# Patient Record
Sex: MALE | Race: WHITE
[De-identification: names, ages, dates, MRNs, and addresses within clinical notes are randomized per-mention and may not be internally consistent; named-entity substitution may affect disease eponyms.]

---

## 2017-10-19 ENCOUNTER — HOSPITAL ENCOUNTER (INPATIENT)
Dept: HOSPITAL 24 - ER | Age: 72
LOS: 2 days | Discharge: HOME | DRG: 379 | End: 2017-10-21
Attending: INTERNAL MEDICINE | Admitting: INTERNAL MEDICINE
Payer: COMMERCIAL

## 2017-10-19 VITALS — BODY MASS INDEX: 22 KG/M2

## 2017-10-19 DIAGNOSIS — Z87.19: ICD-10-CM

## 2017-10-19 DIAGNOSIS — R94.31: ICD-10-CM

## 2017-10-19 DIAGNOSIS — I10: ICD-10-CM

## 2017-10-19 DIAGNOSIS — R10.84: ICD-10-CM

## 2017-10-19 DIAGNOSIS — M13.89: ICD-10-CM

## 2017-10-19 DIAGNOSIS — K92.2: Primary | ICD-10-CM

## 2017-10-19 DIAGNOSIS — R53.1: ICD-10-CM

## 2017-10-19 LAB
ALBUMIN SERPL BCP-MCNC: 2.9 G/DL (ref 3.4–5)
ALP SERPL-CCNC: 68 UNITS/L (ref 46–116)
ALT SERPL W P-5'-P-CCNC: 11 UNITS/L (ref 12–78)
AMYLASE SERPL-CCNC: 43 UNITS/L (ref 25–115)
AST SERPL W P-5'-P-CCNC: 13 UNITS/L (ref 15–37)
BACTERIA URNS QL MICRO: NEGATIVE /HPF
BASOPHILS # BLD AUTO: 0 X10^3/UL (ref 0–0.1)
BASOPHILS NFR BLD AUTO: 0.7 % (ref 0.2–1)
BILIRUB UR QL STRIP.AUTO: NEGATIVE
BUN SERPL-MCNC: 29 MG/DL (ref 7–18)
CALCIUM ALBUM COR SERPL-SCNC: (no result) MMOL/L (ref 136–145)
CALCIUM ALBUM COR SERPL-SCNC: 9.4 MG/DL (ref 8.5–10.1)
CALCIUM SERPL-MCNC: 8.5 MG/DL (ref 8.5–10.1)
CHLORIDE SERPL-SCNC: 105 MMOL/L (ref 98–107)
CK MB SERPL-MCNC: < 1 NG/ML (ref 0–4)
CK SERPL-CCNC: 33 UNITS/L (ref 39–308)
CKMB %: 3 % (ref ?–4)
CLARITY UR: (no result)
CO2 SERPL-SCNC: 27.8 MMOL/L (ref 21–32)
COLOR UR AUTO: YELLOW
CREAT SERPL-MCNC: 1.61 MG/DL (ref 0.7–1.3)
EGFR  BLACK RACES: 55 (ref 60–?)
EOSINOPHIL # BLD AUTO: 0.2 X10^3/UL (ref 0–0.2)
EOSINOPHIL NFR BLD AUTO: 2.4 % (ref 0.9–2.9)
ERYTHROCYTE [DISTWIDTH] IN BLOOD BY AUTOMATED COUNT: 15.2 % (ref 11.6–16.5)
GLUCOSE UR QL STRIP.AUTO: NEGATIVE
HCT VFR BLD AUTO: 31.4 % (ref 42–54)
HCT VFR BLD AUTO: 34.6 % (ref 42–54)
HCT VFR BLD AUTO: 37.8 % (ref 42–54)
HGB BLD-MCNC: 10.6 G/DL (ref 13.5–18)
HGB BLD-MCNC: 11.6 G/DL (ref 13.5–18)
HGB BLD-MCNC: 12.6 G/DL (ref 13.5–18)
KETONES UR QL STRIP.AUTO: (no result)
LEUKOCYTE ESTERASE UR QL STRIP.AUTO: NEGATIVE
LIPASE SERPL-CCNC: 118 UNITS/L (ref 73–393)
LYMPHOCYTES # BLD AUTO: 1.3 X10^3/UL (ref 1.3–2.9)
LYMPHOCYTES NFR BLD AUTO: 19.7 % (ref 21–51)
MCH RBC QN AUTO: 32 PG (ref 27–34)
MCHC RBC AUTO-ENTMCNC: 33.2 G/DL (ref 33–35)
MCV RBC AUTO: 96.5 FL (ref 80–100)
MONOCYTES # BLD AUTO: 0.2 X10^3/UL (ref 0.3–0.8)
MONOCYTES NFR BLD AUTO: 2.7 % (ref 0–13)
NEUTROPHILS # BLD AUTO: 4.7 X10^3/UL (ref 2.2–4.8)
NEUTROPHILS NFR BLD AUTO: 74.5 % (ref 42–75)
NITRITE UR QL STRIP.AUTO: NEGATIVE
PH UR STRIP.AUTO: 5 [PH] (ref 5–8)
PLATELET # BLD: 188 X10^3/UL (ref 150–450)
PMV BLD AUTO: 8.3 FL (ref 7.4–11)
PROT SERPL-MCNC: 6.6 G/DL (ref 6.4–8.2)
PROT UR QL STRIP.AUTO: (no result)
RBC # BLD AUTO: 3.92 X10^6/UL (ref 4.7–6)
RBC # UR STRIP.AUTO: NEGATIVE /UL
RBC #/AREA URNS HPF: (no result) /HPF
SODIUM SERPL-SCNC: 139 MMOL/L (ref 136–145)
SP GR UR STRIP.AUTO: 1 (ref 1–1.03)
SQUAMOUS URNS QL MICRO: (no result) /HPF
TROPONIN I SERPL-MCNC: < 0.02 NG/ML (ref 0–1.5)
UROBILINOGEN UR QL STRIP.AUTO: NORMAL
WBC NRBC COR # BLD AUTO: 6.4 X10^3/UL (ref 3.6–10)

## 2017-10-19 PROCEDURE — 85018 HEMOGLOBIN: CPT

## 2017-10-19 PROCEDURE — 80053 COMPREHEN METABOLIC PANEL: CPT

## 2017-10-19 PROCEDURE — 82550 ASSAY OF CK (CPK): CPT

## 2017-10-19 PROCEDURE — 85025 COMPLETE CBC W/AUTO DIFF WBC: CPT

## 2017-10-19 PROCEDURE — 36415 COLL VENOUS BLD VENIPUNCTURE: CPT

## 2017-10-19 PROCEDURE — 82270 OCCULT BLOOD FECES: CPT

## 2017-10-19 PROCEDURE — 85610 PROTHROMBIN TIME: CPT

## 2017-10-19 PROCEDURE — 99284 EMERGENCY DEPT VISIT MOD MDM: CPT

## 2017-10-19 PROCEDURE — 93005 ELECTROCARDIOGRAM TRACING: CPT

## 2017-10-19 PROCEDURE — C9113 INJ PANTOPRAZOLE SODIUM, VIA: HCPCS

## 2017-10-19 PROCEDURE — 82553 CREATINE MB FRACTION: CPT

## 2017-10-19 PROCEDURE — 82150 ASSAY OF AMYLASE: CPT

## 2017-10-19 PROCEDURE — 74177 CT ABD & PELVIS W/CONTRAST: CPT

## 2017-10-19 PROCEDURE — 83690 ASSAY OF LIPASE: CPT

## 2017-10-19 PROCEDURE — 85014 HEMATOCRIT: CPT

## 2017-10-19 PROCEDURE — 94760 N-INVAS EAR/PLS OXIMETRY 1: CPT

## 2017-10-19 PROCEDURE — S0030 INJECTION, METRONIDAZOLE: HCPCS

## 2017-10-19 PROCEDURE — 84484 ASSAY OF TROPONIN QUANT: CPT

## 2017-10-19 PROCEDURE — 96374 THER/PROPH/DIAG INJ IV PUSH: CPT

## 2017-10-19 PROCEDURE — 96367 TX/PROPH/DG ADDL SEQ IV INF: CPT

## 2017-10-19 PROCEDURE — 96375 TX/PRO/DX INJ NEW DRUG ADDON: CPT

## 2017-10-19 PROCEDURE — 96365 THER/PROPH/DIAG IV INF INIT: CPT

## 2017-10-19 PROCEDURE — 81001 URINALYSIS AUTO W/SCOPE: CPT

## 2017-10-19 PROCEDURE — S0028 INJECTION, FAMOTIDINE, 20 MG: HCPCS

## 2017-10-19 RX ADMIN — CLONAZEPAM PRN MG: 1 TABLET ORAL at 23:54

## 2017-10-19 RX ADMIN — PANTOPRAZOLE SODIUM SCH MLS/HR: 40 INJECTION, POWDER, FOR SOLUTION INTRAVENOUS at 22:54

## 2017-10-19 RX ADMIN — MEPERIDINE HYDROCHLORIDE PRN MG: 25 INJECTION INTRAMUSCULAR; INTRAVENOUS; SUBCUTANEOUS at 22:58

## 2017-10-19 RX ADMIN — METRONIDAZOLE SCH MLS/HR: 5 INJECTION, SOLUTION INTRAVENOUS at 21:23

## 2017-10-19 RX ADMIN — MEPERIDINE HYDROCHLORIDE PRN MG: 25 INJECTION INTRAMUSCULAR; INTRAVENOUS; SUBCUTANEOUS at 16:58

## 2017-10-19 RX ADMIN — PANTOPRAZOLE SODIUM SCH MLS/HR: 40 INJECTION, POWDER, FOR SOLUTION INTRAVENOUS at 13:21

## 2017-10-19 RX ADMIN — SODIUM CHLORIDE SCH MLS/HR: 9 INJECTION, SOLUTION INTRAVENOUS at 13:21

## 2017-10-19 NOTE — DR.GENAD
HPI





- PCP


Primary Care Physician: S





- HPI Comment


HPI Comment: PROGRESSIVE OVER FEW MONTHS. WORSE TODAY. NO FEVER. DYSPNEA ON 

EXERSION.





- Complaint/Symptoms


Chief Complaint Doctors Comments: GENERALIZE WEAKNESS, ABDOMINAL PAIN AND GI 

BLEEDING.


Chief Complaint:: PT. STATES HE THINKS HIS ULCERATIVE COLITIS IS FLARING UP. 

PT. STATES HE HAS HAD BLOOD IN HIS STOOL FOR ABOUT 1-2 MONTHS. LAST BOWEL 

MOVEMENT THIS MORNING. PT. ALSO C/O WEAKNESS AND DIZZINESS UPON STANDING.





- Nurses notes reviewed


Nurses Notes Review: Yes





- Source


History Provided: Patient, Family Member





- Mode of Arrival


Mode of Arrival: Wheelchair





- Timing


Onset of Chief Complaint: 08/20/17


Came on: Gradually





- Duration


Duration: Constant


Duration: Days





- Severity


Severity: Moderate





PMH





- PMH


Past Medical History: Yes


Past Medical History: Arthritis, Hypertension, Kidney Stones


Past Medical History Comment: ULCERATIVE COLITIS, BIPOLAR


Past Surgical History: Yes


Surgical History: Abdominal Surgery, Ortho Surgery, Other





- Family History


History of Family Medical Conditions: Yes


Family Medical History: MI, Coronary Artery Disease, Hypertension





- Social History


Does patient currently use any type of tobacco product: No


Have you used tobacco products in the last 12 months: No


Type of Tobacco Use: None


Does any household member use tobacco: No


Alcohol Use: None


Do you use any recreational Drugs:: No


Lives With: Spouse


Lives Where: Home





- infectious screening


In the last 2 months have you had wt loss of >10#?: NO


Have you had fever, night sweats or hemotysis?: No


Have you traveled outside the country in the last 6 months?: No


Isolation: Standard





ROS





- Review of Systems


Constitutional: Weakness


Eyes: Tearing


ENTM: negative: Ear Pain, Nose Discharge, Nose Congestion, Throat Pain


Respiratoy: Non-Productive Cough, Short of Breath.  negative: Productive Cough, 

Wheezing, Hemoptysis


Cardiovascular: negative: Syncope (NEAR SYNCOPAL FEELING.)


Gastrointestinal/Abdominal: Abdominal Pain


Genitourinary: negative: Dysuria, Hematuria


Neurological: Weakness


Musculoskeletal: Joint Swelling, Muscle Pain


Integumentary: No Symptoms Reported


Endocrine: No Symptoms Reported


All Other Systems: Reviewed and Negative





PE





- Vital Signs


Vitals: 


 





Temperature                      97.4 F


Pulse Rate                       78


Respiratory Rate                 17


Blood Pressure [Left Arm]        97/58


Blood Pressure [Right Arm]       148/86


Blood Pressure                   134/75


O2 Sat by Pulse Oximetry         95











- General


Limitations: No Limitations


General Appearance: Alert





- Head


Head Exam: Normal Inspection





- Eyes


Eye exam: Normal Appearance





- ENT


ENT Exam: Normal  External Ear Exam


External Ear Exam: Normal External Inspection


TM/Canal Exam: Bilateral Normal


Nose Exam: Normal Nose Exam


Mouth Exam: Normal Inspection


Throat Exam: Normal Inspection





- Neck


Neck Exam: Normal Inspection





- Chest


Chest Inspection: Symmetric Chest Wall Rise





- Respiratory


Respiratory Exam: Normal Lung Sounds Bilat





- Cardiovascular


Cardiovascular Exam: Regular Rate, Normal Rhythm, Normal Heart Sounds





- Abdominal Exam


Abdominal Exam: Normal Bowel Sounds, Soft, Tenderness


Abdominal Tenderness: RLQ, LLQ, Suprapubic





- Extremities


Extremities Exam: Normal Inspection





- Back


Back Exam: Normal Inspection





- Neurologic


Neurological Exam: Alert, Oriented X3





- Psychiatric


Psychiatric Exam: Normal Affect, Normal Mood





- Skin


Skin Exam: Normal Color





MDM





- Differential Diagnosis


Differential Diagnosis:  ABDOMINAL PAIN, BOWEL OBSTRUCTION, UTI, ULCERATIVE, 

COLITIS





Course





- Treatment


Treatment: SEE ORDERS.





- Education/Counseling


Education/Counseling: Patient, Education


Educated On: Diagnosis





ROR





- Labs Reviewed


Result Diagrams: 


 10/19/17 11:20





 10/19/17 11:20


Laboratory: 


 











WBC  6.4 X10^3/uL (3.6-10.0)   10/19/17  11:20    


 


RBC  3.92 X10^6/uL (4.7-6.0)  L  10/19/17  11:20    


 


Hgb  12.6 g/dL (13.5-18.0)  L  10/19/17  11:20    


 


Hct  37.8 % (42.0-54.0)  L  10/19/17  11:20    


 


MCV  96.5 fL (80.0-100.0)   10/19/17  11:20    


 


MCH  32.0 pg (27.0-34.0)   10/19/17  11:20    


 


MCHC  33.2 g/dL (33.0-35.0)   10/19/17  11:20    


 


RDW  15.2 % (11.6-16.5)   10/19/17  11:20    


 


Plt Count  188 X10^3/uL (150.0-450.0)   10/19/17  11:20    


 


MPV  8.3 fL (7.4-11.0)   10/19/17  11:20    


 


Neut %  74.5 % (42.0-75.0)   10/19/17  11:20    


 


Lymph %  19.7 % (21.0-51.0)  L  10/19/17  11:20    


 


Mono %  2.7 % (0.0-13.0)   10/19/17  11:20    


 


Eos %  2.4 % (0.9-2.9)   10/19/17  11:20    


 


Baso %  0.7 % (0.2-1.0)   10/19/17  11:20    


 


Neut #  4.7 x10^3/uL (2.2-4.8)   10/19/17  11:20    


 


Lymph #  1.3 X10^3/uL (1.3-2.9)   10/19/17  11:20    


 


Mono #  0.2 x10^3/uL (0.3-0.8)  L  10/19/17  11:20    


 


Eos #  0.2 x10^3/uL (0.0-0.2)   10/19/17  11:20    


 


Baso #  0.0 X10^3/uL (0.0-0.1)   10/19/17  11:20    


 


Absolute Nucleated RBC  0.0 /100WBC  10/19/17  11:20    


 


Sodium  139 mmol/L (136-145)   10/19/17  11:20    


 


Corrected Sodium  TNP   10/19/17  11:20    


 


Potassium  4.6 mmol/L (3.5-5.1)   10/19/17  11:20    


 


Chloride  105 mmol/L ()   10/19/17  11:20    


 


Carbon Dioxide  27.8 mmol/L (21-32)   10/19/17  11:20    


 


BUN  29 mg/dL (7-18)  H  10/19/17  11:20    


 


Creatinine  1.61 mg/dL (0.70-1.30)  H  10/19/17  11:20    


 


Est GFR (MDRD) Af Amer  55  (>60)  L  10/19/17  11:20    


 


Est GFR (MDRD) Non-Af  45  (>60)  L  10/19/17  11:20    


 


Glucose  109 mg/dL (65-99)  H  10/19/17  11:20    


 


Calcium  8.5 mg/dL (8.5-10.1)   10/19/17  11:20    


 


Corrected Calcium  9.4 mg/dL (8.5-10.1)   10/19/17  11:20    


 


Total Bilirubin  0.20 mg/dL (0.2-1.0)   10/19/17  11:20    


 


AST  13 Units/L (15-37)  L  10/19/17  11:20    


 


ALT  11 Units/L (12-78)  L  10/19/17  11:20    


 


Alkaline Phosphatase  68 Units/L ()   10/19/17  11:20    


 


Creatine Kinase  33 Units/L ()  L  10/19/17  11:20    


 


CK-MB (CK-2)  < 1.0 ng/mL (0-4.0)   10/19/17  11:20    


 


CK/CKMB % Calc  3.0 % (<4)   10/19/17  11:20    


 


Troponin I  < 0.02 ng/mL (0-1.5)   10/19/17  11:20    


 


Total Protein  6.6 g/dL (6.4-8.2)   10/19/17  11:20    


 


Albumin  2.9 g/dL (3.4-5.0)  L  10/19/17  11:20    


 


Globulin  3.7 g/dL (2.5-4.5)   10/19/17  11:20    


 


Albumin/Globulin Ratio  0.8 Ratio (1.1-2.1)  L  10/19/17  11:20    


 


Amylase  43 Units/L ()   10/19/17  11:20    


 


Lipase  118 Units/L ()   10/19/17  11:20    


 


Stool Description  Fob tube   10/19/17  11:35    


 


Stl Occult Blood (IFOB)  Positive  (NEGATIVE)  A  10/19/17  11:35    














- Discharge Plan


Condition: Stable





- Follow ups/Referrals


Follow ups/Referrals: 


Andrew Kelly [Primary Care Provider] - 3 days





- Instructions

## 2017-10-19 NOTE — CT
HISTORY:  Abdominal pain



Study:  CT abdomen pelvis with contrast



Comparison: 10/16/2014 report



Technique: Axial post-contrast images with coronal and sagittal reformats. Dose reduction procedures 
were used with mA/kv adjusted for body size. 



Findings:



The lung bases are clear. Pleural calcifications are present suggestive of prior asbestos exposure. T
here is a hiatal hernia present. The liver, spleen, adrenal glands, and pancreas are within normal li
mits. There is a caval filter present. The kidneys are unobstructed and without masses. Bilateral sma
ll renal cysts are present. No ureteral calculi are identified. The appendix is not identified. There
 are no secondary signs of appendicitis. There are no findings suggestive of diverticulitis or coliti
s. Calcific atherosclerotic change is present in a nondilated abdominal aorta. No significant intrape
ritoneal or retroperitoneal lymphadenopathy is identified. There is a radiodensity within the lumen o
f the distal descending/transverse duodenum which is of uncertain etiology and significance. Examinat
ion of the pelvis demonstrated no evidence for pelvic masses, pelvic fluid, or pelvic lymphadenopathy
. No bladder abnormality is identified. No lytic or blastic skeletal lesions are identified. There is
 a compression fracture of L1 likely old.



IMPRESSION:

 

No acute intra-abdominal or intrapelvic abnormality



Pleural calcifications suggestive of prior asbestos exposure







Reported By:Electronically Signed by DANIEL CRYSTAL MD at 10/19/2017 2:01:55 PM

## 2017-10-19 NOTE — DR.CONSULT
Consult





- Consultation for Day of:


Date: 10/19/17





- Chief Complaint


Chief Complaint: hematochezia





- Allergies


Allergies/Adverse Reactions: 


 Allergies











Allergy/AdvReac Type Severity Reaction Status Date / Time


 


No Known Drug Allergies Allergy   Verified 10/19/17 15:35














- History of Present Illness


History of Present Illness: Patient is a 72you male who was referred for Occult 

GI bleed. Patient has a history of Chrons for which he used to take lialda and 

is no longer on this medication. Patients last colonoscopy was 2011 which 

showeled ulcerative colitis and hyperplastic rectal polyp. Patient states the 

rectal bleeding has been going on for about 6 months off and on but has gotten 

worse. Patient denies melena, diarrhea, abdominal pain, n/v, dysphagia and 

dyspepsia.





- Past Medical History


Past Medical History: Arthritis, Hypertension, Kidney Stones


Additional Medical History: BIPOLAR DISORDER (NO MEDICATION IN 2 YEARS), BPH, 

HX GASTROINTESTINAL ULCER, COLITIS, HX FX (R) FOOT, PARKINSON'S, CATARACTS, UTI'

S, BACK PAIN





- Past Surgical History


Surgical History: Abdominal Surgery, Ortho Surgery, Other


Additional Surgical History: HERNIA REPAIR, ACDF SURGERY -JAN 2017





- Family History


Family Medical History: MI, Coronary Artery Disease, Hypertension





- Social History


Does patient currently use any type of tobacco product: No


Have you used tobacco products in the last 12 months: No


Type of Tobacco Use: None


How many years tobacco product used: 40


Does any household member use tobacco: No


Alcohol Use: None


Drug Use: None





- Medications


Home Medications: 


 





Oxycodone HCl/Acetaminophen [Oxycodone-Acetaminophen 5-325] 1 - 2 tab PO Q4-6H 

PRN 10/19/17 [History Confirmed 10/19/17]


Tizanidine HCl [Tizanidine HCl] 1 tab PO HS 10/19/17 [History Confirmed 10/19/17

]


Venlafaxine HCl 1 tab PO DAILY 10/19/17 [History Confirmed 10/19/17]











- Review of Systems


Constitutional: Weakness


Eyes: No Symptoms Reported


ENT: No Symptoms Reported


Respiratory: No Symptoms Reported


Cardiovascular: No Symptoms Reported


Gastrointestinal: Hematochezia


Genitourinary: No Symptoms Reported


Musculoskeletal: No Symptoms Reported


Skin: No Symptoms Reported


Neurological: No Symptoms Reported





- Physical Exam


Vital Signs: 


 





Temperature                      98.7 F


Pulse Rate [Left Brachial]       71


Pulse Rate                       78


Respiratory Rate                 18


Blood Pressure [Left Arm]        127/67


Blood Pressure [Right Arm]       148/86


Blood Pressure                   134/75


O2 Sat by Pulse Oximetry         97








Oriented: Normal


Eyes: Normal


Ear: Normal


Nose: Normal


Throat: Normal


Respiratory: Clear Throughout


Cardiovascular: Normal


: Normal


Auscultation: Bowel Sounds: Normal


Palpation: Normal


Tenderness: Normal (no tenderness)


Skin: Normal


Musculoskeletal: Normal


Psychiatric: Normal


Mood Description: Calm


Affect: Normal


Speech Pattern: Clear





- Plan


Plan: Assessment.  1. Occult GI Bleed.  2. H/O ulcerative colitis.  Plan.  1. 

Monitor Hgb transfuse as needed, will need endoscopic workup as outpatient.  2. 

Cipro, Flagy and solumedrol IV. Will follow up with patient as outpatient after 

hospital discharge. Patient will need to be discharge on Cipro and Flagyl, and 

prednisone 40mg PO Daily x2 weeks then 20mg daily and follow up in office

## 2017-10-20 LAB
ALBUMIN SERPL BCP-MCNC: 2.5 G/DL (ref 3.4–5)
ALP SERPL-CCNC: 57 UNITS/L (ref 46–116)
ALT SERPL W P-5'-P-CCNC: 10 UNITS/L (ref 12–78)
AST SERPL W P-5'-P-CCNC: 13 UNITS/L (ref 15–37)
BASOPHILS # BLD AUTO: 0 X10^3/UL (ref 0–0.1)
BASOPHILS NFR BLD AUTO: 0.2 % (ref 0.2–1)
BUN SERPL-MCNC: 19 MG/DL (ref 7–18)
CALCIUM ALBUM COR SERPL-SCNC: 141 MMOL/L (ref 136–145)
CALCIUM ALBUM COR SERPL-SCNC: 9.4 MG/DL (ref 8.5–10.1)
CALCIUM SERPL-MCNC: 8.2 MG/DL (ref 8.5–10.1)
CHLORIDE SERPL-SCNC: 107 MMOL/L (ref 98–107)
CO2 SERPL-SCNC: 24.8 MMOL/L (ref 21–32)
CREAT SERPL-MCNC: 1.39 MG/DL (ref 0.7–1.3)
EGFR  BLACK RACES: > 60 (ref 60–?)
EOSINOPHIL # BLD AUTO: 0 X10^3/UL (ref 0–0.2)
EOSINOPHIL NFR BLD AUTO: 0.1 % (ref 0.9–2.9)
ERYTHROCYTE [DISTWIDTH] IN BLOOD BY AUTOMATED COUNT: 15 % (ref 11.6–16.5)
HCT VFR BLD AUTO: 33.8 % (ref 42–54)
HCT VFR BLD AUTO: 34.1 % (ref 42–54)
HGB BLD-MCNC: 11.4 G/DL (ref 13.5–18)
HGB BLD-MCNC: 11.5 G/DL (ref 13.5–18)
LYMPHOCYTES # BLD AUTO: 0.8 X10^3/UL (ref 1.3–2.9)
LYMPHOCYTES NFR BLD AUTO: 12.5 % (ref 21–51)
MCH RBC QN AUTO: 32.4 PG (ref 27–34)
MCHC RBC AUTO-ENTMCNC: 33.6 G/DL (ref 33–35)
MCV RBC AUTO: 96.2 FL (ref 80–100)
MONOCYTES # BLD AUTO: 0.1 X10^3/UL (ref 0.3–0.8)
MONOCYTES NFR BLD AUTO: 0.9 % (ref 0–13)
NEUTROPHILS # BLD AUTO: 5.2 X10^3/UL (ref 2.2–4.8)
NEUTROPHILS NFR BLD AUTO: 86.3 % (ref 42–75)
PLATELET # BLD: 191 X10^3/UL (ref 150–450)
PMV BLD AUTO: 9 FL (ref 7.4–11)
PROT SERPL-MCNC: 5.7 G/DL (ref 6.4–8.2)
RBC # BLD AUTO: 3.51 X10^6/UL (ref 4.7–6)
SODIUM SERPL-SCNC: 140 MMOL/L (ref 136–145)
WBC NRBC COR # BLD AUTO: 6 X10^3/UL (ref 3.6–10)

## 2017-10-20 RX ADMIN — METRONIDAZOLE SCH MLS/HR: 5 INJECTION, SOLUTION INTRAVENOUS at 02:55

## 2017-10-20 RX ADMIN — TAMSULOSIN HYDROCHLORIDE SCH MG: 0.4 CAPSULE ORAL at 21:42

## 2017-10-20 RX ADMIN — CIPROFLOXACIN SCH MLS/HR: 2 INJECTION, SOLUTION INTRAVENOUS at 10:09

## 2017-10-20 RX ADMIN — FAMOTIDINE SCH MLS/HR: 20 INJECTION, SOLUTION INTRAVENOUS at 11:06

## 2017-10-20 RX ADMIN — PANTOPRAZOLE SODIUM SCH MLS/HR: 40 INJECTION, POWDER, FOR SOLUTION INTRAVENOUS at 14:55

## 2017-10-20 RX ADMIN — VENLAFAXINE SCH MG: 75 TABLET ORAL at 11:07

## 2017-10-20 RX ADMIN — CIPROFLOXACIN SCH MLS/HR: 2 INJECTION, SOLUTION INTRAVENOUS at 00:13

## 2017-10-20 RX ADMIN — TRAMADOL HYDROCHLORIDE PRN MG: 50 TABLET, FILM COATED ORAL at 17:29

## 2017-10-20 RX ADMIN — METRONIDAZOLE SCH MLS/HR: 5 INJECTION, SOLUTION INTRAVENOUS at 21:44

## 2017-10-20 RX ADMIN — SODIUM CHLORIDE SCH MLS/HR: 9 INJECTION, SOLUTION INTRAVENOUS at 05:19

## 2017-10-20 RX ADMIN — MEPERIDINE HYDROCHLORIDE PRN MG: 25 INJECTION INTRAMUSCULAR; INTRAVENOUS; SUBCUTANEOUS at 21:52

## 2017-10-20 RX ADMIN — METRONIDAZOLE SCH MLS/HR: 5 INJECTION, SOLUTION INTRAVENOUS at 10:09

## 2017-10-20 RX ADMIN — CIPROFLOXACIN SCH MLS/HR: 2 INJECTION, SOLUTION INTRAVENOUS at 21:44

## 2017-10-20 RX ADMIN — MEPERIDINE HYDROCHLORIDE PRN MG: 25 INJECTION INTRAMUSCULAR; INTRAVENOUS; SUBCUTANEOUS at 05:21

## 2017-10-20 RX ADMIN — SODIUM CHLORIDE SCH MLS/HR: 9 INJECTION, SOLUTION INTRAVENOUS at 22:03

## 2017-10-20 RX ADMIN — FAMOTIDINE SCH MLS/HR: 20 INJECTION, SOLUTION INTRAVENOUS at 21:44

## 2017-10-20 RX ADMIN — TAMSULOSIN HYDROCHLORIDE SCH MG: 0.4 CAPSULE ORAL at 11:08

## 2017-10-20 RX ADMIN — METRONIDAZOLE SCH MLS/HR: 5 INJECTION, SOLUTION INTRAVENOUS at 14:45

## 2017-10-20 RX ADMIN — PANTOPRAZOLE SODIUM SCH MLS/HR: 40 INJECTION, POWDER, FOR SOLUTION INTRAVENOUS at 21:44

## 2017-10-21 VITALS
DIASTOLIC BLOOD PRESSURE: 50 MMHG | SYSTOLIC BLOOD PRESSURE: 82 MMHG | DIASTOLIC BLOOD PRESSURE: 50 MMHG | SYSTOLIC BLOOD PRESSURE: 82 MMHG

## 2017-10-21 LAB
ALBUMIN SERPL BCP-MCNC: 2.3 G/DL (ref 3.4–5)
ALP SERPL-CCNC: 52 UNITS/L (ref 46–116)
ALT SERPL W P-5'-P-CCNC: 7 UNITS/L (ref 12–78)
AST SERPL W P-5'-P-CCNC: 14 UNITS/L (ref 15–37)
BASOPHILS # BLD AUTO: 0 X10^3/UL (ref 0–0.1)
BASOPHILS NFR BLD AUTO: 0.1 % (ref 0.2–1)
BUN SERPL-MCNC: 20 MG/DL (ref 7–18)
CALCIUM ALBUM COR SERPL-SCNC: 143 MMOL/L (ref 136–145)
CALCIUM ALBUM COR SERPL-SCNC: 9.3 MG/DL (ref 8.5–10.1)
CALCIUM SERPL-MCNC: 7.9 MG/DL (ref 8.5–10.1)
CHLORIDE SERPL-SCNC: 111 MMOL/L (ref 98–107)
CO2 SERPL-SCNC: 26.2 MMOL/L (ref 21–32)
CREAT SERPL-MCNC: 1.42 MG/DL (ref 0.7–1.3)
EGFR  BLACK RACES: > 60 (ref 60–?)
EOSINOPHIL # BLD AUTO: 0 X10^3/UL (ref 0–0.2)
EOSINOPHIL NFR BLD AUTO: 0 % (ref 0.9–2.9)
ERYTHROCYTE [DISTWIDTH] IN BLOOD BY AUTOMATED COUNT: 15.3 % (ref 11.6–16.5)
HCT VFR BLD AUTO: 31.2 % (ref 42–54)
HCT VFR BLD AUTO: 33.3 % (ref 42–54)
HGB BLD-MCNC: 10.5 G/DL (ref 13.5–18)
HGB BLD-MCNC: 11.1 G/DL (ref 13.5–18)
LYMPHOCYTES # BLD AUTO: 1.1 X10^3/UL (ref 1.3–2.9)
LYMPHOCYTES NFR BLD AUTO: 14 % (ref 21–51)
MCH RBC QN AUTO: 32.3 PG (ref 27–34)
MCHC RBC AUTO-ENTMCNC: 33.3 G/DL (ref 33–35)
MCV RBC AUTO: 96.9 FL (ref 80–100)
MONOCYTES # BLD AUTO: 0.2 X10^3/UL (ref 0.3–0.8)
MONOCYTES NFR BLD AUTO: 2.2 % (ref 0–13)
NEUTROPHILS # BLD AUTO: 6.7 X10^3/UL (ref 2.2–4.8)
NEUTROPHILS NFR BLD AUTO: 83.7 % (ref 42–75)
PLATELET # BLD: 221 X10^3/UL (ref 150–450)
PMV BLD AUTO: 9.5 FL (ref 7.4–11)
PROT SERPL-MCNC: 5.5 G/DL (ref 6.4–8.2)
RBC # BLD AUTO: 3.43 X10^6/UL (ref 4.7–6)
SODIUM SERPL-SCNC: 142 MMOL/L (ref 136–145)
WBC NRBC COR # BLD AUTO: 8 X10^3/UL (ref 3.6–10)

## 2017-10-21 RX ADMIN — TRAMADOL HYDROCHLORIDE PRN MG: 50 TABLET, FILM COATED ORAL at 08:46

## 2017-10-21 RX ADMIN — VENLAFAXINE SCH MG: 75 TABLET ORAL at 08:45

## 2017-10-21 RX ADMIN — CIPROFLOXACIN SCH MLS/HR: 2 INJECTION, SOLUTION INTRAVENOUS at 08:45

## 2017-10-21 RX ADMIN — SODIUM CHLORIDE SCH: 9 INJECTION, SOLUTION INTRAVENOUS at 07:23

## 2017-10-21 RX ADMIN — PANTOPRAZOLE SODIUM SCH MLS/HR: 40 INJECTION, POWDER, FOR SOLUTION INTRAVENOUS at 05:32

## 2017-10-21 RX ADMIN — FAMOTIDINE SCH MLS/HR: 20 INJECTION, SOLUTION INTRAVENOUS at 08:45

## 2017-10-21 RX ADMIN — CLONAZEPAM PRN MG: 1 TABLET ORAL at 08:45

## 2017-10-21 RX ADMIN — TAMSULOSIN HYDROCHLORIDE SCH MG: 0.4 CAPSULE ORAL at 08:45

## 2017-10-21 RX ADMIN — METRONIDAZOLE SCH MLS/HR: 5 INJECTION, SOLUTION INTRAVENOUS at 04:00

## 2017-10-21 RX ADMIN — METRONIDAZOLE SCH MLS/HR: 5 INJECTION, SOLUTION INTRAVENOUS at 08:45

## 2018-03-05 ENCOUNTER — HOSPITAL ENCOUNTER (OUTPATIENT)
Dept: HOSPITAL 67 - CT | Age: 73
Discharge: HOME | End: 2018-03-05
Attending: NURSE PRACTITIONER
Payer: COMMERCIAL

## 2018-03-05 DIAGNOSIS — R41.82: Primary | ICD-10-CM

## 2018-04-25 ENCOUNTER — HOSPITAL ENCOUNTER (OUTPATIENT)
Dept: HOSPITAL 24 - RAD | Age: 73
End: 2018-04-25
Attending: ORTHOPAEDIC SURGERY
Payer: COMMERCIAL

## 2018-04-25 DIAGNOSIS — M48.02: Primary | ICD-10-CM

## 2018-04-25 PROCEDURE — 72125 CT NECK SPINE W/O DYE: CPT

## 2018-04-25 NOTE — CT
HISTORY: Neck pain for 1 month, history of previous surgery and spinal stenosis



Study: CT cervical spine without contrast



Comparison: None



Technique: Multiple axial images of the cervical spine were obtained from the skull base to the thora
cic inlet without administration of IV contrast.  Sagittal and coronal reformats were performed and r
eviewed. Dose reduction techniques including Automated Exposure Control (AEC) and adjustment of mA an
d kV were utilized.



Findings:



Postsurgical changes are noted with what appears be separate ACDF constructs, spanning C3-C5 and C6 t
hrough C7. No abnormal perihardware lucency or malalignment is identified. There are spondylitic ferraro
ges above below the level of. There is moderate to severe left foraminal stenosis at C3-C4 and C4-C5 
due to uncovertebral osteophytes. No high-grade spinal canal stenosis is identified.



The visualized prevertebral and paraspinal soft tissues appear normal. The visualized lung apices are
 clear. 



IMPRESSION:

 

1. Postsurgical changes as described without acute osseous abnormality.

2. Moderate to severe left foraminal stenosis C3-C4 and C4-C5.





Reported By:Electronically Signed by VIOLETTA BERNSTEIN MD at 4/25/2018 3:23:47 PM

## 2018-05-21 ENCOUNTER — HOSPITAL ENCOUNTER (OUTPATIENT)
Dept: HOSPITAL 67 - ED | Age: 73
Setting detail: OBSERVATION
LOS: 1 days | Discharge: HOME | End: 2018-05-22
Attending: INTERNAL MEDICINE | Admitting: INTERNAL MEDICINE
Payer: COMMERCIAL

## 2018-05-21 VITALS — TEMPERATURE: 98 F | DIASTOLIC BLOOD PRESSURE: 88 MMHG | SYSTOLIC BLOOD PRESSURE: 120 MMHG

## 2018-05-21 VITALS — TEMPERATURE: 98.2 F | SYSTOLIC BLOOD PRESSURE: 142 MMHG | DIASTOLIC BLOOD PRESSURE: 79 MMHG

## 2018-05-21 VITALS — WEIGHT: 156.25 LBS | HEIGHT: 71 IN | BODY MASS INDEX: 21.87 KG/M2

## 2018-05-21 DIAGNOSIS — R53.1: ICD-10-CM

## 2018-05-21 DIAGNOSIS — R10.9: ICD-10-CM

## 2018-05-21 DIAGNOSIS — R07.89: Primary | ICD-10-CM

## 2018-05-21 DIAGNOSIS — K21.9: ICD-10-CM

## 2018-05-21 DIAGNOSIS — R06.02: ICD-10-CM

## 2018-05-21 LAB
APTT BLD: 27.9 SECONDS (ref 24.5–33.6)
PLATELET # BLD: 271 K/UL (ref 142–355)
POTASSIUM SERPL-SCNC: 3.8 MMOL/L (ref 3.6–5.2)
SODIUM SERPL-SCNC: 137 MMOL/L (ref 136–145)

## 2018-05-21 PROCEDURE — 96374 THER/PROPH/DIAG INJ IV PUSH: CPT

## 2018-05-21 PROCEDURE — G0378 HOSPITAL OBSERVATION PER HR: HCPCS

## 2018-05-21 PROCEDURE — 93005 ELECTROCARDIOGRAM TRACING: CPT

## 2018-05-21 PROCEDURE — 36415 COLL VENOUS BLD VENIPUNCTURE: CPT

## 2018-05-21 PROCEDURE — 85610 PROTHROMBIN TIME: CPT

## 2018-05-21 PROCEDURE — 82550 ASSAY OF CK (CPK): CPT

## 2018-05-21 PROCEDURE — 85027 COMPLETE CBC AUTOMATED: CPT

## 2018-05-21 PROCEDURE — 94760 N-INVAS EAR/PLS OXIMETRY 1: CPT

## 2018-05-21 PROCEDURE — 96360 HYDRATION IV INFUSION INIT: CPT

## 2018-05-21 PROCEDURE — 99220: CPT

## 2018-05-21 PROCEDURE — 96367 TX/PROPH/DG ADDL SEQ IV INF: CPT

## 2018-05-21 PROCEDURE — 80053 COMPREHEN METABOLIC PANEL: CPT

## 2018-05-21 PROCEDURE — 82150 ASSAY OF AMYLASE: CPT

## 2018-05-21 PROCEDURE — 81000 URINALYSIS NONAUTO W/SCOPE: CPT

## 2018-05-21 PROCEDURE — 85730 THROMBOPLASTIN TIME PARTIAL: CPT

## 2018-05-21 PROCEDURE — 96365 THER/PROPH/DIAG IV INF INIT: CPT

## 2018-05-21 PROCEDURE — 96372 THER/PROPH/DIAG INJ SC/IM: CPT

## 2018-05-21 PROCEDURE — 84484 ASSAY OF TROPONIN QUANT: CPT

## 2018-05-21 PROCEDURE — 96366 THER/PROPH/DIAG IV INF ADDON: CPT

## 2018-05-21 PROCEDURE — 99284 EMERGENCY DEPT VISIT MOD MDM: CPT

## 2018-05-21 PROCEDURE — 83690 ASSAY OF LIPASE: CPT

## 2018-05-22 VITALS — DIASTOLIC BLOOD PRESSURE: 75 MMHG | TEMPERATURE: 98 F | SYSTOLIC BLOOD PRESSURE: 128 MMHG

## 2018-05-22 LAB
PLATELET # BLD: 215 K/UL (ref 142–355)
POTASSIUM SERPL-SCNC: 4.3 MMOL/L (ref 3.6–5.2)
SODIUM SERPL-SCNC: 136 MMOL/L (ref 136–145)